# Patient Record
Sex: FEMALE | NOT HISPANIC OR LATINO
[De-identification: names, ages, dates, MRNs, and addresses within clinical notes are randomized per-mention and may not be internally consistent; named-entity substitution may affect disease eponyms.]

---
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
## 2024-11-09 ENCOUNTER — HOSPITAL ENCOUNTER (EMERGENCY)
Facility: HOSPITAL | Age: 19
Discharge: HOME | End: 2024-11-09
Attending: PEDIATRICS
Payer: MEDICAID

## 2024-11-09 VITALS
HEART RATE: 70 BPM | BODY MASS INDEX: 19.99 KG/M2 | RESPIRATION RATE: 16 BRPM | OXYGEN SATURATION: 99 % | HEIGHT: 65 IN | DIASTOLIC BLOOD PRESSURE: 67 MMHG | WEIGHT: 120 LBS | TEMPERATURE: 98 F | SYSTOLIC BLOOD PRESSURE: 105 MMHG

## 2024-11-09 DIAGNOSIS — H66.002 NON-RECURRENT ACUTE SUPPURATIVE OTITIS MEDIA OF LEFT EAR WITHOUT SPONTANEOUS RUPTURE OF TYMPANIC MEMBRANE: Primary | ICD-10-CM

## 2024-11-09 DIAGNOSIS — H61.22 IMPACTED CERUMEN OF LEFT EAR: ICD-10-CM

## 2024-11-09 PROCEDURE — 99283 EMERGENCY DEPT VISIT LOW MDM: CPT

## 2024-11-09 RX ORDER — AMOXICILLIN 875 MG/1
875 TABLET, COATED ORAL 2 TIMES DAILY
Qty: 14 TABLET | Refills: 0 | Status: SHIPPED | OUTPATIENT
Start: 2024-11-09 | End: 2024-11-16

## 2024-11-09 NOTE — DISCHARGE INSTRUCTIONS
Your ear pain is likely due to combination of impacted earwax as well as an early ear infection.  Start the antibiotics prescribed.  Also  an over-the-counter earwax removal medicine called Debrox.  Motrin or Tylenol for pain.

## 2024-11-09 NOTE — ED PROVIDER NOTES
Patient Seen in: [facility] Emergency Department      History     Chief Complaint   Patient presents with    Ear Problem     Stated Complaint: L ear feels clogged, cant hear well.    Subjective:   HPI    19-year-old female who feels as though her left ear is \"clogged\".  Tried irrigating it without any improvement.  No recent fevers or URI symptoms.    Objective:     Past Medical History:    Bipolar affective (HCC)    Depression              History reviewed. No pertinent surgical history.             Social History     Socioeconomic History    Marital status: Single   Tobacco Use    Smoking status: Never    Smokeless tobacco: Never   Vaping Use    Vaping status: Never Used   Substance and Sexual Activity    Alcohol use: Never    Drug use: Never     Social Drivers of Health      Received from [facility]    Social Connections    Received from [facility]    Housing Stability                  Physical Exam     ED Triage Vitals [11/09/24 1208]   /67   Pulse 74   Resp 15   Temp 98.3 °F (36.8 °C)   Temp src Oral   SpO2 98 %   O2 Device None (Room air)       Current Vitals:   Vital Signs  BP: 105/67  Pulse: 74  Resp: 15  Temp: 98.3 °F (36.8 °C)  Temp src: Oral  MAP (mmHg): 80    Oxygen Therapy  SpO2: 98 %  O2 Device: None (Room air)        Physical Exam  Vitals and nursing note reviewed.   Constitutional:       General: She is not in acute distress.     Appearance: She is well-developed. She is not diaphoretic.   HENT:      Head: Normocephalic and atraumatic.      Right Ear: Tympanic membrane normal.      Ears:      Comments: Impacted cerumen however mild erythema noted to portion of TM visualized.     Nose: Nose normal.   Eyes:      Pupils: Pupils are equal, round, and reactive to light.   Cardiovascular:      Heart sounds: Normal heart sounds.   Pulmonary:      Effort: Pulmonary effort is normal.   Abdominal:      General: Abdomen is flat.   Musculoskeletal:       Cervical back: Normal range of motion and neck supple.   Skin:     General: Skin is warm.      Coloration: Skin is not pale.      Findings: No rash.   Neurological:      Mental Status: She is alert and oriented to person, place, and time.      Cranial Nerves: No cranial nerve deficit.      Motor: No abnormal muscle tone.      Coordination: Coordination normal.   Psychiatric:         Behavior: Behavior normal.         Thought Content: Thought content normal.         Judgment: Judgment normal.         ED Course   Labs Reviewed - No data to display         Medications administered:  Medications - No data to display    Pulse oximetry:  Pulse oximetry on room air is 98% and is normal.     Cardiac monitoring:  Initial heart rate is 74 and is normal for age    Vital signs:  Vitals:    11/09/24 1208   BP: 105/67   Pulse: 74   Resp: 15   Temp: 98.3 °F (36.8 °C)   TempSrc: Oral   SpO2: 98%   Weight: 54.4 kg   Height: 165.1 cm (5' 5\")     Chart review:  ^^ Review of prior external notes from unique sources (non-[facility] ED records):          MDM      Assessment & Plan:    19 year old female with left ear discomfort.  Stable vitals, no acute distress.  Impacted cerumen noted.  Irrigated ear and did remove some cerumen.  Noted portion of TM appeared erythematous, possibly indicative of early otitis as well.  Prescription for amoxicillin written.  Advised over-the-counter earwax removal drops as well.  Motrin or Tylenol for pain.        ^^ Independent historian: parent  ^^ Prescription drug and OTC medication management considerations: as noted above      Patient or caregiver understands the course of events that occurred in the emergency department. Instructed to return to emergency department or contact PCP for persistent, recurrent, or worsening symptoms.    This report has been produced using speech recognition software and may contain errors related to that system including, but not limited to, errors in grammar, punctuation,  and spelling, as well as words and phrases that possibly may have been recognized inappropriately.  If there are any questions or concerns, contact the dictating provider for clarification.     NOTE: The 21st Century C[employer] Act makes medical notes available to patients.  Be advised that this is a medical document written in medical language and may contain abbreviations or verbiage that is unfamiliar or direct.  It is primarily intended to carry relevant historical information, physical exam findings, and the clinical assessment of the physician.         Medical Decision Making  Problems Addressed:  Impacted cerumen of left ear: acute illness or injury with systemic symptoms  Non-recurrent acute suppurative otitis media of left ear without spontaneous rupture of tympanic membrane: acute illness or injury with systemic symptoms    Amount and/or Complexity of Data Reviewed  Independent Historian: parent    Risk  OTC drugs.  Prescription drug management.        Disposition and Plan     Clinical Impression:  1. Non-recurrent acute suppurative otitis media of left ear without spontaneous rupture of tympanic membrane    2. Impacted cerumen of left ear         Disposition:  Discharge  11/9/2024 12:51 pm    Follow-up:  [facility] Emergency Department  [address] [place] [address]  [phone]  Follow up  As needed, If symptoms worsen          Medications Prescribed:  Current Discharge Medication List        START taking these medications    Details   amoxicillin 875 MG Oral Tab Take 1 tablet (875 mg total) by mouth 2 (two) times daily for 7 days.  Qty: 14 tablet, Refills: 0                 Supplementary Documentation:

## 2024-11-09 NOTE — ED INITIAL ASSESSMENT (HPI)
Patient A&Ox4 here with grandmother for left ear pain. Patient states it started Thursday feeling clogged then tried cleaning it out with a q-tip and this morning tried rubbing alcohol and now feels like it is completely clogged. Denies drainage out of the ear, cannot hear out of the ear currently.

## 2024-11-12 ENCOUNTER — HOSPITAL ENCOUNTER (EMERGENCY)
Facility: HOSPITAL | Age: 19
Discharge: HOME | End: 2024-11-12
Attending: PEDIATRICS
Payer: MEDICAID

## 2024-11-12 VITALS
SYSTOLIC BLOOD PRESSURE: 125 MMHG | OXYGEN SATURATION: 98 % | HEIGHT: 66 IN | HEART RATE: 94 BPM | DIASTOLIC BLOOD PRESSURE: 76 MMHG | RESPIRATION RATE: 16 BRPM | TEMPERATURE: 98 F | WEIGHT: 120 LBS | BODY MASS INDEX: 19.29 KG/M2

## 2024-11-12 DIAGNOSIS — H61.22 IMPACTED CERUMEN OF LEFT EAR: Primary | ICD-10-CM

## 2024-11-12 PROCEDURE — 69209 REMOVE IMPACTED EAR WAX UNI: CPT

## 2024-11-12 PROCEDURE — 99282 EMERGENCY DEPT VISIT SF MDM: CPT

## 2024-11-12 RX ORDER — BUPROPION HYDROCHLORIDE 150 MG/1
150 TABLET ORAL DAILY
COMMUNITY
Start: 2024-05-28

## 2024-11-12 RX ORDER — DROSPIRENONE 4 MG/1
4 TABLET, FILM COATED ORAL
COMMUNITY
Start: 2024-07-23

## 2024-11-12 RX ORDER — LAMOTRIGINE 25 MG/1
25 TABLET ORAL 2 TIMES DAILY
COMMUNITY
Start: 2024-05-28

## 2024-11-12 NOTE — ED PROVIDER NOTES
Patient Seen in: [facility] Emergency Department      History     Chief Complaint   Patient presents with    Ear Problem Pain     Stated Complaint: +otitis media on PO abx still having pain    Subjective:   HPI      Patient is a 19-year-old female complaining of decreased hearing and some pain to her left ear.  She was diagnosed with an otitis and started on antibiotics and has been on it for 3 days but states she cannot hear out of her left ear.    Objective:     Past Medical History:    Bipolar affective (HCC)    Depression              History reviewed. No pertinent surgical history.             Social History     Socioeconomic History    Marital status: Single   Tobacco Use    Smoking status: Never     Passive exposure: Never    Smokeless tobacco: Never   Vaping Use    Vaping status: Never Used   Substance and Sexual Activity    Alcohol use: Never    Drug use: Never     Social Drivers of Health      Received from [facility]    Social Connections    Received from [facility]    Housing Stability                  Physical Exam     ED Triage Vitals [11/12/24 1507]   /76   Pulse 94   Resp 16   Temp 98.2 °F (36.8 °C)   Temp src Oral   SpO2 98 %   O2 Device None (Room air)       Current Vitals:   Vital Signs  BP: 125/76  Pulse: 94  Resp: 16  Temp: 98.2 °F (36.8 °C)  Temp src: Oral    Oxygen Therapy  SpO2: 98 %  O2 Device: None (Room air)        Physical Exam  HEENT: The pupils are equal round and react to light, oropharynx is clear, mucous membranes are moist.  Ears:left TM not visible due to copious amounts of wax.  Area without mastoid tenderness. Right TM is normal.  Neck: Supple, full range of motion.  CV: Chest is clear to auscultation, no wheezes rales or rhonchi.  Cardiac exam normal S1-S2, no murmurs rubs or gallops.  Abdomen: Soft, nontender, nondistended.  Bowel sounds present throughout.  Extremities: Warm and well perfused.  Dermatologic exam: No rashes  or lesions.  Neurologic exam: Cranial nerves 2-12 grossly intact.    Orthopedic exam: normal,from.    ED Course   Labs Reviewed - No data to display         Patient's left ear was copiously irrigated and a very large amount of impacted cerumen was removed.  On further exam the external canal is now clear and there is no active infection of the TM     Patient's vitals reviewed within normal limits.  Pulse is 94 normal for age  MDM      Patient presents with left-sided ear pain and decreased hearing differential considered includes otitis versus impacted cerumen.  Patient will follow with the PMD and return to the ED for worsening of symptoms.  She is instructed to use Motrin as needed for pain control    Patient was screened and evaluated during this visit.   As a treating physician attending to the patient, I determined, within reasonable clinical confidence and prior to discharge, that an emergency medical condition was not or was no longer present.  There was no indication for further evaluation, treatment or admission on an emergency basis.  Comprehensive verbal and written discharge and follow-up instructions were provided to help prevent relapse or worsening.  Patient was instructed to follow-up with the primary care provider for further evaluation and treatment, but to return immediately to the ER for worsening, concerning, new, changing or persisting symptoms.  I discussed the case with the patient/parent and they had no questions, complaints, or concerns.  Patient/parent felt comfortable going home.    Medical Decision Making      Disposition and Plan     Clinical Impression:  1. Impacted cerumen of left ear         Disposition:  Discharge  11/12/2024  3:37 pm    Follow-up:  No follow-up provider specified.        Medications Prescribed:  Discharge Medication List as of 11/12/2024  3:39 PM              Supplementary Documentation:

## 2025-05-14 ENCOUNTER — HOSPITAL ENCOUNTER (EMERGENCY)
Facility: HOSPITAL | Age: 20
Discharge: HOME | End: 2025-05-14
Attending: STUDENT IN AN ORGANIZED HEALTH CARE EDUCATION/TRAINING PROGRAM
Payer: MEDICAID

## 2025-05-14 VITALS
HEART RATE: 78 BPM | RESPIRATION RATE: 20 BRPM | TEMPERATURE: 99 F | OXYGEN SATURATION: 99 % | DIASTOLIC BLOOD PRESSURE: 74 MMHG | BODY MASS INDEX: 24.8 KG/M2 | WEIGHT: 140 LBS | SYSTOLIC BLOOD PRESSURE: 114 MMHG | HEIGHT: 63 IN

## 2025-05-14 DIAGNOSIS — J06.9 VIRAL URI WITH COUGH: Primary | ICD-10-CM

## 2025-05-14 LAB
FLUAV + FLUBV RNA SPEC NAA+PROBE: NEGATIVE
FLUAV + FLUBV RNA SPEC NAA+PROBE: NEGATIVE
RSV RNA SPEC NAA+PROBE: NEGATIVE
SARS-COV-2 RNA RESP QL NAA+PROBE: NOT DETECTED

## 2025-05-14 PROCEDURE — 99283 EMERGENCY DEPT VISIT LOW MDM: CPT

## 2025-05-14 PROCEDURE — 0241U SARS-COV-2/FLU A AND B/RSV BY PCR (GENEXPERT): CPT

## 2025-05-14 PROCEDURE — 0241U SARS-COV-2/FLU A AND B/RSV BY PCR (GENEXPERT): CPT | Performed by: STUDENT IN AN ORGANIZED HEALTH CARE EDUCATION/TRAINING PROGRAM

## 2025-05-14 PROCEDURE — 87430 STREP A AG IA: CPT

## 2025-05-14 PROCEDURE — 87430 STREP A AG IA: CPT | Performed by: STUDENT IN AN ORGANIZED HEALTH CARE EDUCATION/TRAINING PROGRAM

## 2025-05-14 RX ORDER — BUSPIRONE HYDROCHLORIDE 10 MG/1
10 TABLET ORAL 2 TIMES DAILY
COMMUNITY
Start: 2025-04-29

## 2025-05-14 RX ORDER — LURASIDONE HYDROCHLORIDE 40 MG/1
40 TABLET, FILM COATED ORAL
COMMUNITY
Start: 2025-04-29

## 2025-05-14 NOTE — ED PROVIDER NOTES
Patient Seen in: [facility] Emergency Department      History     Chief Complaint   Patient presents with    Sore Throat    Cough/URI     Stated Complaint: sore throat, cough    Subjective:   HPI  History of Present Illness              Patient is a 20-year-old female presented to the emergency room with reports of sore throat and cough.  She woke up with symptoms yesterday.  She denies any fevers or bodyaches.  She states about a week ago she had nasal congestion which seem to have resolved.  Today she notes she was coughing so hard she had an episode of vomiting.    Objective:     Past Medical History:    Bipolar affective (HCC)    Depression    PTSD (post-traumatic stress disorder)              History reviewed. No pertinent surgical history.             Social History     Socioeconomic History    Marital status: Single   Tobacco Use    Smoking status: Never     Passive exposure: Never    Smokeless tobacco: Never   Vaping Use    Vaping status: Never Used   Substance and Sexual Activity    Alcohol use: Never    Drug use: Never     Social Drivers of Health      Received from [facility]    Housing Stability                                Physical Exam     ED Triage Vitals [05/14/25 0606]   /68   Pulse 87   Resp 20   Temp 98.6 °F (37 °C)   Temp src Temporal   SpO2 95 %   O2 Device None (Room air)       Current Vitals:   Vital Signs  BP: 114/74  Pulse: 78  Resp: 20  Temp: 98.6 °F (37 °C)  Temp src: Temporal  MAP (mmHg): 85    Oxygen Therapy  SpO2: 99 %  O2 Device: None (Room air)          Physical Exam  Vitals and nursing note reviewed.   Constitutional:       General: She is not in acute distress.     Appearance: Normal appearance.   HENT:      Head: Normocephalic.      Right Ear: Tympanic membrane normal.      Left Ear: Tympanic membrane normal.      Nose: Nose normal.      Mouth/Throat:      Mouth: Mucous membranes are moist.      Tonsils: No tonsillar exudate or tonsillar  abscesses. 0 on the right. 0 on the left.   Eyes:      Extraocular Movements: Extraocular movements intact.      Conjunctiva/sclera: Conjunctivae normal.      Pupils: Pupils are equal, round, and reactive to light.   Cardiovascular:      Rate and Rhythm: Normal rate and regular rhythm.      Pulses: Normal pulses.      Heart sounds: Normal heart sounds.   Pulmonary:      Effort: Pulmonary effort is normal.   Abdominal:      General: Abdomen is flat. Bowel sounds are normal. There is no distension.      Palpations: Abdomen is soft.      Tenderness: There is no abdominal tenderness. There is no right CVA tenderness, left CVA tenderness, guarding or rebound.      Hernia: No hernia is present.   Musculoskeletal:         General: No swelling or tenderness. Normal range of motion.      Cervical back: Normal range of motion.   Skin:     General: Skin is warm and dry.      Capillary Refill: Capillary refill takes less than 2 seconds.   Neurological:      General: No focal deficit present.      Mental Status: She is alert and oriented to person, place, and time. Mental status is at baseline.   Psychiatric:         Mood and Affect: Mood normal.                   ED Course     Labs Reviewed   SARS-COV-2/FLU A AND B/RSV BY PCR (GENEXPERT) - Normal    Narrative:     This test is intended for the qualitative detection and differentiation of SARS-CoV-2, influenza A, influenza B, and respiratory syncytial virus (RSV) viral RNA in nasopharyngeal or nares swabs from individuals suspected of respiratory viral infection consistent with COVID-19 by their healthcare provider. Signs and symptoms of respiratory viral infection due to SARS-CoV-2, influenza, and RSV can be similar.    Test performed using the Xpert Xpress SARS-CoV-2/FLU/RSV (real time RT-PCR)  assay on the GeneXpert instrument, [employer], [place] [address].   This test is being used under the Food and Drug Administration's Emergency Use Authorization.    The authorized Fact  Sheet for Healthcare Providers for this assay is available upon request from the laboratory.   RAPID STREP A SCREEN (LC) - Normal    Narrative:     A confirmatory culture is recommended if clinically indicated.          Results                                MDM              Medical Decision Making  The differential includes the following  Strep pharyngitis, viral pharyngitis, postnasal drip    Pertinent comorbidities include  As detailed above    Pertinent social history includes  Lives with friend who also had similar URI symptoms over a week ago    Labs  Strep is negative      External data reviewed    Discussion of management with external providers    ER course  Patient is afebrile nontachycardic nonhypoxic care.  She has clear bilateral breath sounds.  She has no evidence of PTA on exam and clear bilateral breath sounds.  Patient strep is negative.  She would like to follow-up her results on [employer] which she will create for herself.  I will also follow-up on results and give her a call if anything is positive.    7:44 AM  Patient called and informed her GEN expert test was negative.  Recommend continued conservative management return for any worsening symptoms such as shortness of breath inability to tolerate food.    Disposition and Plan     Clinical Impression:  1. Viral URI with cough         Disposition:  Discharge  5/14/2025  7:00 am    Follow-up:  Bria May MD  [address] [place] [id number]  [phone]    Follow up            Medications Prescribed:  Discharge Medication List as of 5/14/2025  7:03 AM                Supplementary Documentation:

## 2025-08-26 ENCOUNTER — OFFICE VISIT (OUTPATIENT)
Facility: LOCATION | Age: 20
End: 2025-08-26

## 2025-08-26 DIAGNOSIS — R05.3 CHRONIC COUGH: ICD-10-CM

## 2025-08-26 DIAGNOSIS — K21.9 LARYNGOPHARYNGEAL REFLUX: Primary | ICD-10-CM

## 2025-08-26 PROCEDURE — 99203 OFFICE O/P NEW LOW 30 MIN: CPT | Performed by: OTOLARYNGOLOGY

## 2025-08-26 PROCEDURE — 31575 DIAGNOSTIC LARYNGOSCOPY: CPT | Performed by: OTOLARYNGOLOGY

## 2025-08-26 RX ORDER — OMEPRAZOLE 40 MG/1
40 CAPSULE, DELAYED RELEASE ORAL DAILY
Qty: 30 CAPSULE | Refills: 2 | Status: SHIPPED | OUTPATIENT
Start: 2025-08-26